# Patient Record
(demographics unavailable — no encounter records)

---

## 2025-01-29 NOTE — PHYSICAL EXAM
[Alert] : alert [Well Nourished] : well nourished [No Acute Distress] : no acute distress [Well Developed] : well developed [Normal Sclera/Conjunctiva] : normal sclera/conjunctiva [EOMI] : extra ocular movement intact [No Proptosis] : no proptosis [Normal Oropharynx] : the oropharynx was normal [Thyroid Not Enlarged] : the thyroid was not enlarged [No Thyroid Nodules] : no palpable thyroid nodules [No Respiratory Distress] : no respiratory distress [No Accessory Muscle Use] : no accessory muscle use [Clear to Auscultation] : lungs were clear to auscultation bilaterally [Normal S1, S2] : normal S1 and S2 [Normal Rate] : heart rate was normal [Regular Rhythm] : with a regular rhythm [No Edema] : no peripheral edema [Pedal Pulses Normal] : the pedal pulses are present [Normal Bowel Sounds] : normal bowel sounds [Not Tender] : non-tender [Not Distended] : not distended [Soft] : abdomen soft [Normal Anterior Cervical Nodes] : no anterior cervical lymphadenopathy [Spine Straight] : spine straight [No Spinal Tenderness] : no spinal tenderness [No Stigmata of Cushings Syndrome] : no stigmata of Cushings Syndrome [Normal Gait] : normal gait [Normal Strength/Tone] : muscle strength and tone were normal [No Rash] : no rash [Acanthosis Nigricans] : no acanthosis nigricans [Normal Reflexes] : deep tendon reflexes were 2+ and symmetric [No Tremors] : no tremors [Oriented x3] : oriented to person, place, and time

## 2025-01-29 NOTE — ASSESSMENT
[FreeTextEntry1] : 1) DM2: controlled, A1C on at 7%. target of <8%. Natural hx of the disease and importance of treatment targets discussed at length, she verbalized understanding. ADA diet and importance of exercise discussed at length. Plan is to resume Januvia full dose.  STOP JARDIANCE AS THIS IS LIKELY THE REASON FOR WHY UNEXPLAINED WEIGHT LOSS IS OCCURRING.  stop metformin to reduce risk of polypharmacy Increase FSG monitoring to include post prandial readings. She will call back if FSG is > 200, we will consider metformin resumption. Refer to Nutritionist today. We chi check microalbumin, lipids and labs on the NV. Discuss vaccines and podiatry/opthalmology referrals on NV   3) Essential HTN: Pt is not  at goal BP and on an ACE inh. intolerant of HCTZ in the past, start enalapril/CCB today. Reassess microalbumin prior to the NV.   4) Dyslipidemia: Pt is on a moderate intensity statin. REassess lipids on the next visit. LDL target <100. 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

## 2025-01-29 NOTE — HISTORY OF PRESENT ILLNESS
[FreeTextEntry1] : 90 y/o F w/ Hx of NIDDM2, HTN, HLD and recently diagnosed PMR. Generally feels well and endorses no acute complaints. Also reports HX of OP, recently started on Fosamax and Vit D replacement. Now on Prednisone 5 mg PO QD. Initially a1C worsened to 12, now POC FSG shows improvement in post prandial hyperglycemia at home. She is adherent to MEtformin 2 GM QD and REcently started JAnuvia 100 mg PO QD.   12/2018: Here for /fu, generally feels well and endorses no acute complaints. No interval events since LV. Today reports complete tolerance w/o s/e after starting Januvia and improved compliance w/ metformin full dose. FSG remain < 200 at home and she denies hypoglycemic events.  1/2025 Here for f/u, generally feels well and endorses no acute complaints. No interval events since LV. notes unexplained weight loss has returned.  . reports her PCP said her sugar was "well controlled" and reduced januvia to 50 mg daily. however she reports on her last visit the a1c increased, prompting her PCP to stop januvia and switch to jardiance instead. pt reports polyuria and is concerned about new onset unintentional weight loss She denies GI s/e. Denies polyuria or polydipsia. She otherwise denies any f/c, CP, SOB, palpitations, tremors, depressed mood, anxiety, palpitations, n/v, stool/urinary abn, skin/weight changes, heat/cold intolerance, HAs, breast/nipple changes, polyuria/polydipsia/nocturia or other complaints. .